# Patient Record
Sex: FEMALE | Race: WHITE | Employment: FULL TIME | URBAN - METROPOLITAN AREA
[De-identification: names, ages, dates, MRNs, and addresses within clinical notes are randomized per-mention and may not be internally consistent; named-entity substitution may affect disease eponyms.]

---

## 2021-08-31 ENCOUNTER — OFFICE VISIT (OUTPATIENT)
Dept: URGENT CARE | Facility: URGENT CARE | Age: 21
End: 2021-08-31
Payer: COMMERCIAL

## 2021-08-31 VITALS
HEART RATE: 89 BPM | SYSTOLIC BLOOD PRESSURE: 119 MMHG | OXYGEN SATURATION: 99 % | TEMPERATURE: 98.7 F | DIASTOLIC BLOOD PRESSURE: 80 MMHG | WEIGHT: 141.8 LBS

## 2021-08-31 DIAGNOSIS — R45.82 FEELING WORRIED: ICD-10-CM

## 2021-08-31 DIAGNOSIS — H60.393 INFECTIVE OTITIS EXTERNA, BILATERAL: Primary | ICD-10-CM

## 2021-08-31 PROCEDURE — 99204 OFFICE O/P NEW MOD 45 MIN: CPT | Performed by: INTERNAL MEDICINE

## 2021-08-31 RX ORDER — OFLOXACIN 3 MG/ML
10 SOLUTION AURICULAR (OTIC) 2 TIMES DAILY
Qty: 10 ML | Refills: 0 | Status: SHIPPED | OUTPATIENT
Start: 2021-08-31 | End: 2021-09-10

## 2021-08-31 RX ORDER — CYCLOBENZAPRINE HCL 10 MG
1 TABLET ORAL DAILY PRN
COMMUNITY
Start: 2021-07-26

## 2021-08-31 RX ORDER — HYDROXYZINE HYDROCHLORIDE 25 MG/1
TABLET, FILM COATED ORAL
COMMUNITY
Start: 2021-02-02

## 2021-08-31 RX ORDER — FLUOCINOLONE ACETONIDE 0.11 MG/ML
OIL AURICULAR (OTIC) PRN
COMMUNITY
Start: 2021-06-03

## 2021-09-01 NOTE — PROGRESS NOTES
SUBJECTIVE:  Lashon Phillips is an 21 year old female who presents for some ear pain in right ear.  Started yesterday.  Hurts pretty bad.  Pain radiates toward neck.  Left ear has been feeling itchy.  Before the pain started had a full feeling in ear.  No cough or runny nose.  No fevers.  Did smoke until a month ago.  Also would like to have white spots in throat looked at as she is worried about throat cancer.  No meds taken for her sxs.      PMH:  Anxiety, acne    Social History     Socioeconomic History     Marital status: Single     Spouse name: None     Number of children: None     Years of education: None     Highest education level: None   Occupational History     None   Tobacco Use     Smoking status: Current Some Day Smoker     Last attempt to quit: 8/10/2021     Years since quittin.0     Smokeless tobacco: Never Used   Substance and Sexual Activity     Alcohol use: None     Drug use: None     Sexual activity: None   Other Topics Concern     None   Social History Narrative     None     Social Determinants of Health     Financial Resource Strain:      Difficulty of Paying Living Expenses:    Food Insecurity:      Worried About Running Out of Food in the Last Year:      Ran Out of Food in the Last Year:    Transportation Needs:      Lack of Transportation (Medical):      Lack of Transportation (Non-Medical):    Physical Activity:      Days of Exercise per Week:      Minutes of Exercise per Session:    Stress:      Feeling of Stress :    Social Connections:      Frequency of Communication with Friends and Family:      Frequency of Social Gatherings with Friends and Family:      Attends Mosque Services:      Active Member of Clubs or Organizations:      Attends Club or Organization Meetings:      Marital Status:    Intimate Partner Violence:      Fear of Current or Ex-Partner:      Emotionally Abused:      Physically Abused:      Sexually Abused:      No family history on  file.    ALLERGIES:  Patient has no known allergies.    Current Outpatient Medications   Medication     cyclobenzaprine (FLEXERIL) 10 MG tablet     fluocinolone acetonide 0.01 % OIL     hydrOXYzine (ATARAX) 25 MG tablet     sulfacetamide sodium-sulfur 10-5 % EMUL     No current facility-administered medications for this visit.         ROS:  ROS is done and is negative for general/constitutional, eye, ENT, Respiratory, cardiovascular, GI, , Skin, musculoskeletal except as noted elsewhere.  All other review of systems negative except as noted elsewhere.      OBJECTIVE:  /80   Pulse 89   Temp 98.7  F (37.1  C) (Tympanic)   Wt 64.3 kg (141 lb 12.8 oz)   SpO2 99%   GENERAL APPEARANCE: Alert, in no acute distress  EYES: normal  EARS: External ears normal. Right canal with patch of mild erythema; right tm with small amount of cloudy fluid, no erythema.  Left canal with diffuse moderate erythema; left TM with clear bubbles, no erythema.   NOSE:moderately inflamed mucosa  OROPHARYNX:mild erythema, no tonsillar hypertrophy and no exudates present  NECK:No adenopathy,masses or thyromegaly  RESP: normal and clear to auscultation  CV:regular rate and rhythm and no murmurs, clicks, or gallops  ABDOMEN: Abdomen soft, non-tender. BS normal. No masses, organomegaly  SKIN: no ulcers, lesions or rash  MUSCULOSKELETAL:Musculoskeletal normal      RESULTS  No results found for any visits on 08/31/21..  No results found for this or any previous visit (from the past 48 hour(s)).    ASSESSMENT/PLAN:    ASSESSMENT / PLAN:  (H60.393) Infective otitis externa, bilateral  (primary encounter diagnosis)  Comment:   Plan: ofloxacin (FLOXIN) 0.3 % otic solution,         Otolaryngology Referral        Reviewed medication instructions and side effects. Follow up if experiences side effects.. I reviewed supportive care, otc meds to use if needed, expected course, and signs of concern.  Follow up as needed or if she does not improve within  1 week(s) or if worsens in any way. F/u with ENT if she prefers Reviewed red flag symptoms and is to go to the ER if experiences any of these.    (R45.82) Feeling worried  Comment: pt worried about her chronic ear and nose sxs, as well as concern about throat cancer as recently a family member  of throat cancer.    Plan: Otolaryngology Referral        Referred to ENT for further eval to be seen within 1 month unless all sxs resolve.      PPE worn: mask and shield.    See [facility] for orders, medications, letters, patient instructions    Princess Asencio M.D.

## 2021-09-30 ENCOUNTER — TRANSFERRED RECORDS (OUTPATIENT)
Dept: HEALTH INFORMATION MANAGEMENT | Facility: CLINIC | Age: 21
End: 2021-09-30